# Patient Record
Sex: FEMALE | Race: BLACK OR AFRICAN AMERICAN | Employment: OTHER | URBAN - METROPOLITAN AREA
[De-identification: names, ages, dates, MRNs, and addresses within clinical notes are randomized per-mention and may not be internally consistent; named-entity substitution may affect disease eponyms.]

---

## 2019-02-18 ENCOUNTER — ANESTHESIA EVENT (OUTPATIENT)
Dept: ENDOSCOPY | Age: 79
End: 2019-02-18
Payer: MEDICARE

## 2019-02-19 ENCOUNTER — ANESTHESIA (OUTPATIENT)
Dept: ENDOSCOPY | Age: 79
End: 2019-02-19
Payer: MEDICARE

## 2019-02-19 ENCOUNTER — HOSPITAL ENCOUNTER (OUTPATIENT)
Age: 79
Setting detail: OUTPATIENT SURGERY
Discharge: HOME | End: 2019-02-19
Attending: INTERNAL MEDICINE | Admitting: INTERNAL MEDICINE
Payer: MEDICARE

## 2019-02-19 VITALS
WEIGHT: 138 LBS | RESPIRATION RATE: 13 BRPM | BODY MASS INDEX: 27.09 KG/M2 | SYSTOLIC BLOOD PRESSURE: 111 MMHG | DIASTOLIC BLOOD PRESSURE: 66 MMHG | OXYGEN SATURATION: 99 % | TEMPERATURE: 97.6 F | HEART RATE: 58 BPM | HEIGHT: 60 IN

## 2019-02-19 LAB — GLUCOSE BLD STRIP.AUTO-MCNC: 97 MG/DL (ref 70–110)

## 2019-02-19 PROCEDURE — 74011250636 HC RX REV CODE- 250/636: Performed by: NURSE ANESTHETIST, CERTIFIED REGISTERED

## 2019-02-19 PROCEDURE — 74011250636 HC RX REV CODE- 250/636

## 2019-02-19 PROCEDURE — 76060000032 HC ANESTHESIA 0.5 TO 1 HR: Performed by: INTERNAL MEDICINE

## 2019-02-19 PROCEDURE — 76040000007: Performed by: INTERNAL MEDICINE

## 2019-02-19 PROCEDURE — 82962 GLUCOSE BLOOD TEST: CPT

## 2019-02-19 RX ORDER — METOPROLOL SUCCINATE 25 MG/1
25 TABLET, EXTENDED RELEASE ORAL DAILY
COMMUNITY

## 2019-02-19 RX ORDER — SENNOSIDES 8.6 MG/1
1 TABLET ORAL 2 TIMES DAILY
COMMUNITY

## 2019-02-19 RX ORDER — SODIUM CHLORIDE 0.9 % (FLUSH) 0.9 %
5-40 SYRINGE (ML) INJECTION EVERY 8 HOURS
Status: CANCELLED | OUTPATIENT
Start: 2019-02-19

## 2019-02-19 RX ORDER — DEXTROMETHORPHAN/PSEUDOEPHED 2.5-7.5/.8
1.2 DROPS ORAL
Status: CANCELLED | OUTPATIENT
Start: 2019-02-19

## 2019-02-19 RX ORDER — SODIUM CHLORIDE, SODIUM LACTATE, POTASSIUM CHLORIDE, CALCIUM CHLORIDE 600; 310; 30; 20 MG/100ML; MG/100ML; MG/100ML; MG/100ML
75 INJECTION, SOLUTION INTRAVENOUS CONTINUOUS
Status: DISCONTINUED | OUTPATIENT
Start: 2019-02-19 | End: 2019-02-19 | Stop reason: HOSPADM

## 2019-02-19 RX ORDER — LIDOCAINE HYDROCHLORIDE 20 MG/ML
INJECTION, SOLUTION EPIDURAL; INFILTRATION; INTRACAUDAL; PERINEURAL
Status: COMPLETED
Start: 2019-02-19 | End: 2019-02-19

## 2019-02-19 RX ORDER — ATROPINE SULFATE 0.1 MG/ML
0.5 INJECTION INTRAVENOUS
Status: CANCELLED | OUTPATIENT
Start: 2019-02-19 | End: 2019-02-20

## 2019-02-19 RX ORDER — GABAPENTIN 100 MG/1
100 CAPSULE ORAL 3 TIMES DAILY
COMMUNITY

## 2019-02-19 RX ORDER — LIDOCAINE HYDROCHLORIDE 20 MG/ML
INJECTION, SOLUTION EPIDURAL; INFILTRATION; INTRACAUDAL; PERINEURAL AS NEEDED
Status: DISCONTINUED | OUTPATIENT
Start: 2019-02-19 | End: 2019-02-19 | Stop reason: HOSPADM

## 2019-02-19 RX ORDER — SODIUM CHLORIDE 0.9 % (FLUSH) 0.9 %
5-40 SYRINGE (ML) INJECTION AS NEEDED
Status: CANCELLED | OUTPATIENT
Start: 2019-02-19

## 2019-02-19 RX ORDER — PROPOFOL 10 MG/ML
INJECTION, EMULSION INTRAVENOUS
Status: COMPLETED
Start: 2019-02-19 | End: 2019-02-19

## 2019-02-19 RX ORDER — HYDROCHLOROTHIAZIDE 25 MG/1
25 TABLET ORAL DAILY
COMMUNITY

## 2019-02-19 RX ORDER — ATORVASTATIN CALCIUM 80 MG/1
80 TABLET, FILM COATED ORAL DAILY
COMMUNITY

## 2019-02-19 RX ORDER — SODIUM CHLORIDE 0.9 % (FLUSH) 0.9 %
5-40 SYRINGE (ML) INJECTION EVERY 8 HOURS
Status: DISCONTINUED | OUTPATIENT
Start: 2019-02-19 | End: 2019-02-19 | Stop reason: HOSPADM

## 2019-02-19 RX ORDER — FLUMAZENIL 0.1 MG/ML
0.2 INJECTION INTRAVENOUS
Status: CANCELLED | OUTPATIENT
Start: 2019-02-19 | End: 2019-02-19

## 2019-02-19 RX ORDER — PROPOFOL 10 MG/ML
INJECTION, EMULSION INTRAVENOUS AS NEEDED
Status: DISCONTINUED | OUTPATIENT
Start: 2019-02-19 | End: 2019-02-19 | Stop reason: HOSPADM

## 2019-02-19 RX ORDER — AMLODIPINE BESYLATE 10 MG/1
10 TABLET ORAL DAILY
COMMUNITY

## 2019-02-19 RX ORDER — ALBUTEROL SULFATE 90 UG/1
2 AEROSOL, METERED RESPIRATORY (INHALATION)
COMMUNITY

## 2019-02-19 RX ORDER — EPINEPHRINE 0.1 MG/ML
1 INJECTION INTRACARDIAC; INTRAVENOUS
Status: CANCELLED | OUTPATIENT
Start: 2019-02-19 | End: 2019-02-20

## 2019-02-19 RX ORDER — SODIUM CHLORIDE 0.9 % (FLUSH) 0.9 %
5-40 SYRINGE (ML) INJECTION AS NEEDED
Status: DISCONTINUED | OUTPATIENT
Start: 2019-02-19 | End: 2019-02-19 | Stop reason: HOSPADM

## 2019-02-19 RX ORDER — CHROMIUM PICOLINATE 200 MCG
1 TABLET ORAL DAILY
COMMUNITY

## 2019-02-19 RX ORDER — LOSARTAN POTASSIUM 100 MG/1
100 TABLET ORAL DAILY
COMMUNITY

## 2019-02-19 RX ORDER — MONTELUKAST SODIUM 10 MG/1
10 TABLET ORAL DAILY
COMMUNITY

## 2019-02-19 RX ORDER — NALOXONE HYDROCHLORIDE 0.4 MG/ML
0.4 INJECTION, SOLUTION INTRAMUSCULAR; INTRAVENOUS; SUBCUTANEOUS
Status: CANCELLED | OUTPATIENT
Start: 2019-02-19 | End: 2019-02-19

## 2019-02-19 RX ORDER — LANOLIN ALCOHOL/MO/W.PET/CERES
325 CREAM (GRAM) TOPICAL 2 TIMES DAILY
COMMUNITY

## 2019-02-19 RX ORDER — FENTANYL CITRATE 50 UG/ML
INJECTION, SOLUTION INTRAMUSCULAR; INTRAVENOUS
Status: COMPLETED
Start: 2019-02-19 | End: 2019-02-19

## 2019-02-19 RX ORDER — FENTANYL CITRATE 50 UG/ML
INJECTION, SOLUTION INTRAMUSCULAR; INTRAVENOUS AS NEEDED
Status: DISCONTINUED | OUTPATIENT
Start: 2019-02-19 | End: 2019-02-19 | Stop reason: HOSPADM

## 2019-02-19 RX ADMIN — PROPOFOL 20 MG: 10 INJECTION, EMULSION INTRAVENOUS at 11:01

## 2019-02-19 RX ADMIN — PROPOFOL 10 MG: 10 INJECTION, EMULSION INTRAVENOUS at 11:00

## 2019-02-19 RX ADMIN — PROPOFOL 20 MG: 10 INJECTION, EMULSION INTRAVENOUS at 11:02

## 2019-02-19 RX ADMIN — FENTANYL CITRATE 50 MCG: 50 INJECTION, SOLUTION INTRAMUSCULAR; INTRAVENOUS at 11:07

## 2019-02-19 RX ADMIN — PROPOFOL 10 MG: 10 INJECTION, EMULSION INTRAVENOUS at 10:58

## 2019-02-19 RX ADMIN — FAMOTIDINE 20 MG: 10 INJECTION INTRAVENOUS at 09:46

## 2019-02-19 RX ADMIN — FENTANYL CITRATE 50 MCG: 50 INJECTION, SOLUTION INTRAMUSCULAR; INTRAVENOUS at 11:00

## 2019-02-19 RX ADMIN — PROPOFOL 20 MG: 10 INJECTION, EMULSION INTRAVENOUS at 11:04

## 2019-02-19 RX ADMIN — PROPOFOL 20 MG: 10 INJECTION, EMULSION INTRAVENOUS at 11:07

## 2019-02-19 RX ADMIN — SODIUM CHLORIDE, SODIUM LACTATE, POTASSIUM CHLORIDE, AND CALCIUM CHLORIDE: 600; 310; 30; 20 INJECTION, SOLUTION INTRAVENOUS at 10:46

## 2019-02-19 RX ADMIN — LIDOCAINE HYDROCHLORIDE 60 MG: 20 INJECTION, SOLUTION EPIDURAL; INFILTRATION; INTRACAUDAL; PERINEURAL at 10:55

## 2019-02-19 RX ADMIN — SODIUM CHLORIDE, SODIUM LACTATE, POTASSIUM CHLORIDE, AND CALCIUM CHLORIDE 75 ML/HR: 600; 310; 30; 20 INJECTION, SOLUTION INTRAVENOUS at 09:46

## 2019-02-19 NOTE — PROCEDURES
[place] [address], [address] [employer] Procedure Note Saturnino Langford 1940 
[id number] Date of Procedure: 2/19/2019 Preoperative diagnosis: V85.23 - Z68.27,  Body mass index  27.0 - 27.9,  adult 
796.2 - R03.0,  Elevated blood pressure V76.51 - Z12.11,  Colon cancer Screening 
250.00 - E11.9,  Diabetes Postoperative diagnosis:  Diverticulosis. Internal hemorrhoids Type of Anesthesia: MAC (monitered anesthesia care) Description of Findings: same as post op dx Procedure: Procedure(s): 
COLONOSCOPY :  Dr. Neetu Mclaughlin MD 
 
Assistant(s): [unfilled] Type of Anesthesia:MAC  
 
EBL:None Specimens: * No specimens in log * Findings: See printed and scanned procedure note Complications: None Dr. Neetu Mclaughlin MD 
2/19/2019  11:19 AM

## 2019-02-19 NOTE — H&P
[email or web address] Impression: 1. CRCS Plan: 1. [place] with MAC Chief Complaint: CRCS 
 
 
HPI: 
Renato Arnett is a 77 y.o. female who is being seen preop for CRCS. Previous hx of incomplete study due to prep. Elizabeth Javier PMH:  
Past Medical History:  
Diagnosis Date  Asthma  Diabetes ([facility] [address]  Heart failure ([facility] [address]  Hypertension PSH:  
History reviewed. No pertinent surgical history. Social HX:  
Social History Socioeconomic History  Marital status: UNKNOWN Spouse name: Not on file  Number of children: Not on file  Years of education: Not on file  Highest education level: Not on file Social Needs  Financial resource strain: Not on file  Food insecurity - worry: Not on file  Food insecurity - inability: Not on file  Transportation needs - medical: Not on file  Transportation needs - non-medical: Not on file Occupational History  Not on file Tobacco Use  Smoking status: Never Smoker  Smokeless tobacco: Current User  Tobacco comment: Dips Snuff Substance and Sexual Activity  Alcohol use: No  
  Frequency: Never  Drug use: No  
 Sexual activity: No  
Other Topics Concern  Not on file Social History Narrative  Not on file FHX:  
History reviewed. No pertinent family history. Allergy: No Known Allergies Home Medications:  
 
Medications Prior to Admission Medication Sig  
 amLODIPine (NORVASC) 10 mg tablet Take 10 mg by mouth daily.  atorvastatin (LIPITOR) 80 mg tablet Take 80 mg by mouth daily.  Calcium-Cholecalciferol, D3, 600 mg(1,500mg) -400 unit cap Take 1 Cap by mouth daily.  ferrous sulfate 325 mg (65 mg iron) tablet Take 325 mg by mouth two (2) times a day.  gabapentin (NEURONTIN) 100 mg capsule Take 100 mg by mouth three (3) times daily.  hydroCHLOROthiazide (HYDRODIURIL) 25 mg tablet Take 25 mg by mouth daily.  losartan (COZAAR) 100 mg tablet Take 100 mg by mouth daily.  metoprolol succinate (TOPROL-XL) 25 mg XL tablet Take 25 mg by mouth daily.  montelukast (SINGULAIR) 10 mg tablet Take 10 mg by mouth daily.  senna (SENNA) 8.6 mg tablet Take 1 Tab by mouth two (2) times a day.  albuterol (VENTOLIN HFA) 90 mcg/actuation inhaler Take 2 Puffs by inhalation every six (6) hours as needed for Wheezing. Review of Systems:  
 
Constitutional: No fevers, chills, weight loss, fatigue. Cardiovascular: No chest pain, heart palpitations. Respiratory: No cough, SOB, wheezing, chest discomfort, orthopnea. Gastrointestinal: No GI or oabdominal complaints Musculoskeletal: No weakness, arthralgias, wasting. Allergies: As noted. Visit Vitals /41 Pulse 63 Temp 97.6 °F (36.4 °C) Resp 11 Ht 5' (1.524 m) Wt 62.6 kg (138 lb) SpO2 100% Breastfeeding? No  
BMI 26.95 kg/m² Physical Assessment:  
 
constitutional: appearance: well developed, well nourished, normal habitus, no deformities, in no acute distress. ENMT: mouth: normal oral mucosa,lips and gums; good dentition. oropharynx: normal tongue, hard and soft palate; posterior pharynx without erithema, exudate or lesions. respiratory: effort: normal chest excursion; no intercostal retraction or accessory muscle use. cardiovascular: abdominal aorta: normal size and position; no bruits. palpation: PMI of normal size and position; normal rhythm; no thrill or murmurs. abdominal: abdomen: normal consistency; no tenderness or masses. hernias: no hernias appreciated. liver: normal size and consistency. spleen: not palpable. rectal: hemoccult/guaiac: not performed. musculoskeletal: digits and nails: no clubbing, cyanosis, petechiae or other inflammatory conditions. psychiatric: orientation: oriented to time, space and person.   
  
 
Tianna Shelton MD, M.D.  
 [facility], [facility] Pager [phone] 
[email or web address]

## 2019-02-19 NOTE — ANESTHESIA PREPROCEDURE EVALUATION
Anesthetic History No history of anesthetic complications Review of Systems / Medical History Patient summary reviewed, nursing notes reviewed and pertinent labs reviewed Pulmonary Asthma : well controlled Neuro/Psych Within defined limits Cardiovascular Hypertension Past MI and CAD Exercise tolerance: >4 METS 
  
GI/Hepatic/Renal 
Within defined limits Endo/Other Diabetes Other Findings Physical Exam 
 
Airway Mallampati: III 
TM Distance: 4 - 6 cm Neck ROM: short neck Mouth opening: Normal 
 
 Cardiovascular Regular rate and rhythm,  S1 and S2 normal,  no murmur, click, rub, or gallop Rhythm: regular Rate: normal 
 
 
 
 Dental 
 
Dentition: Edentulous Pulmonary Breath sounds clear to auscultation Abdominal 
GI exam deferred Other Findings Anesthetic Plan ASA: 3 Anesthesia type: MAC Anesthetic plan and risks discussed with: Patient

## 2019-02-19 NOTE — DISCHARGE INSTRUCTIONS
Colonoscopy: What to Expect at [address] [place] [address]  After you have a colonoscopy, you will stay at the clinic for 1 to 2 hours until the medicines wear off. Then you can go home. But you will need to arrange for a ride. Your doctor will tell you when you can eat and do your other usual activities. Your doctor will talk to you about when you will need your next colonoscopy. Your doctor can help you decide how often you need to be checked. This will depend on the results of your test and your risk for colorectal cancer. After the test, you may be bloated or have gas pains. You may need to pass gas. If a biopsy was done or a polyp was removed, you may have streaks of blood in your stool (feces) for a few days. This care sheet gives you a general idea about how long it will take for you to recover. But each person recovers at a different pace. Follow the steps below to get better as quickly as possible. How can you care for yourself at home? Activity  · Rest when you feel tired. · You can do your normal activities when it feels okay to do so. Diet  · Follow your doctor's directions for eating. · Unless your doctor has told you not to, drink plenty of fluids. This helps to replace the fluids that were lost during the colon prep. · Do not drink alcohol. Medicines  · If polyps were removed or a biopsy was done during the test, your doctor may tell you not to take aspirin or other anti-inflammatory medicines for a few days. These include ibuprofen (Advil, Motrin) and naproxen (Aleve). Other instructions  · For your safety, do not drive or operate machinery until the medicine wears off and you can think clearly. Your doctor may tell you not to drive or operate machinery until the day after your test.  · Do not sign legal documents or make major decisions until the medicine wears off and you can think clearly. The anesthesia can make it hard for you to fully understand what you are agreeing to.   Follow-up care is a key part of your treatment and safety. Be sure to make and go to all appointments, and call your doctor if you are having problems. It's also a good idea to know your test results and keep a list of the medicines you take. When should you call for help? Call 911 anytime you think you may need emergency care. For example, call if:  · You passed out (lost consciousness). · You pass maroon or bloody stools. · You have severe belly pain. Call your doctor now or seek immediate medical care if:  · Your stools are black and tarlike. · Your stools have streaks of blood, but you did not have a biopsy or any polyps removed. · You have belly pain, or your belly is swollen and firm. · You vomit. · You have a fever. · You are very dizzy. Watch closely for changes in your health, and be sure to contact your doctor if you have any problems. Where can you learn more? Go to [employer]  Enter E264 in the search box to learn more about \"Colonoscopy: What to Expect at Home. \"   © 5282-6222 [employer]. Care instructions adapted under license by [facility] (which disclaims liability or warranty for this information). This care instruction is for use with your licensed healthcare professional. If you have questions about a medical condition or this instruction, always ask your healthcare professional. [place] [address] any warranty or liability for your use of this information. Content Version: 09.0.082401; Current as of: November 14, 2014      DISCHARGE SUMMARY from Nurse     POST-PROCEDURE INSTRUCTIONS:    Call your Physician if you:  ? Observe any excess bleeding. ? Develop a temperature over 100.5o F.  ? Experience abdominal, shoulder or chest pain. ? Notice any signs of decreased circulation or nerve impairment to an extremity such as a change in color, persistent numbness, tingling, coldness or increase in pain. ?  Vomit blood or you have nausea and vomiting lasting longer than 4 hours. ? Are unable to take medications. ? Are unable to urinate within 8 hours after discharge following general anesthesia or intravenous sedation. For the next 24 hours after receiving general anesthesia or intravenous sedation, or while taking prescription Narcotics, limit your activities:  ? Do NOT drive a motor vehicle, operate hazard machinery or power tools, or perform tasks that require coordination. The medication you received during your procedure may have some effect on your mental awareness. ? Do NOT make important personal or business decisions. The medication you received during your procedure may have some effect on your mental awareness. ? Do NOT drink alcoholic beverages. These drinks do not mix well with the medications that have been given to you. ? Upon discharge from the hospital, you must be accompanied by a responsible adult. ? Resume your diet as directed by your physician. ? Resume medications as your physician has prescribed. ? Please give a list of your current medications to your Primary Care Provider. ? Please update this list whenever your medications are discontinued, doses are changed, or new medications (including over-the-counter products) are added. ? Please carry medication information at all times in case of emergency situations. These are general instructions for a healthy lifestyle:    No smoking/ No tobacco products/ Avoid exposure to second hand smoke.  Surgeon General's Warning:  Quitting smoking now greatly reduces serious risk to your health. Obesity, smoking, and a sedentary lifestyle greatly increase your risk for illness.    A healthy diet, regular physical exercise & weight monitoring are important for maintaining a healthy lifestyle   You may be retaining fluid if you have a history of heart failure or if you experience any of the following symptoms:  Weight gain of 3 pounds or more overnight or 5 pounds in a week, increased swelling in our hands or feet or shortness of breath while lying flat in bed. Please call your doctor as soon as you notice any of these symptoms; do not wait until your next office visit. Recognize signs and symptoms of STROKE:  F  -  Face looks uneven  A  -  Arms unable to move or move unevenly  S  -  Speech slurred or non-existent  T  -  Time to call 911 - as soon as signs and symptoms begin - DO NOT go back to bed or wait to see If you get better - TIME IS BRAIN. Colorectal Screening   Colorectal cancer almost always develops from precancerous polyps (abnormal growths) in the colon or rectum. Screening tests can find precancerous polyps, so that they can be removed before they turn into cancer. Screening tests can also find colorectal cancer early, when treatment works best.  [facility] Speak with your physician about when you should begin screening and how often you should be tested. Additional Information    If you have questions, please call [phone]. Remember, MyChart is NOT to be used for urgent needs. For medical emergencies, dial 911. Educational references and/or instructions provided during this visit included:    Diverticulosis      Patient Education        Diverticulosis: Care Instructions  Your Care Instructions  In diverticulosis, pouches called diverticula form in the wall of the large intestine (colon). The pouches do not cause any pain or other symptoms. Most people who have diverticulosis do not know they have it. But the pouches sometimes bleed, and if they become infected, they can cause pain and other symptoms. When this happens, it is called diverticulitis. Diverticula form when pressure pushes the wall of the colon outward at certain weak points. A diet that is too low in fiber can cause diverticula. Follow-up care is a key part of your treatment and safety. Be sure to make and go to all appointments, and call your doctor if you are having problems.  It's also a good idea to know your test results and keep a list of the medicines you take. How can you care for yourself at home? · Include fruits, leafy green vegetables, beans, and whole grains in your diet each day. These foods are high in fiber. · Take a fiber supplement, such as Citrucel or Metamucil, every day if needed. Read and follow all instructions on the label. · Drink plenty of fluids, enough so that your urine is light yellow or clear like water. If you have kidney, heart, or liver disease and have to limit fluids, talk with your doctor before you increase the amount of fluids you drink. · Get at least 30 minutes of exercise on most days of the week. Walking is a good choice. You also may want to do other activities, such as running, swimming, cycling, or playing tennis or team sports. · Cut out foods that cause gas, pain, or other symptoms. When should you call for help? Call your doctor now or seek immediate medical care if:    · You have belly pain.     · You pass maroon or very bloody stools.     · You have a fever.     · You have nausea and vomiting.     · You have unusual changes in your bowel movements or abdominal swelling.     · You have burning pain when you urinate.     · You have abnormal vaginal discharge.     · You have shoulder pain.     · You have cramping pain that does not get better when you have a bowel movement or pass gas.     · You pass gas or stool from your urethra while urinating.    Watch closely for changes in your health, and be sure to contact your doctor if you have any problems. Where can you learn more? Go to http://maryse-jacey.info/. Enter I978 in the search box to learn more about \"Diverticulosis: Care Instructions. \"  Current as of: March 27, 2018  Content Version: 11.9  © 6785-5258 [employer]. Care instructions adapted under license by [employer] (which disclaims liability or warranty for this information).  If you have questions about a medical condition or this instruction, always ask your healthcare professional. [place] [address] any warranty or liability for your use of this information. Discharge information has been reviewed with the patient. The patient verbalized understanding.

## 2019-02-19 NOTE — ANESTHESIA POSTPROCEDURE EVALUATION
Procedure(s): 
COLONOSCOPY. Anesthesia Post Evaluation Multimodal analgesia: multimodal analgesia used between 6 hours prior to anesthesia start to PACU discharge Patient location during evaluation: bedside Patient participation: complete - patient participated Level of consciousness: awake Pain score: 0 Airway patency: patent Anesthetic complications: no 
Cardiovascular status: acceptable Respiratory status: acceptable Hydration status: acceptable Post anesthesia nausea and vomiting:  none Visit Vitals /41 Pulse 63 Temp 36.4 °C (97.6 °F) Resp 11 Ht 5' (1.524 m) Wt 62.6 kg (138 lb) SpO2 100% Breastfeeding? No  
BMI 26.95 kg/m²

## 2021-01-01 ENCOUNTER — HOSPITAL ENCOUNTER (EMERGENCY)
Age: 81
End: 2021-11-12
Attending: EMERGENCY MEDICINE
Payer: MEDICARE

## 2021-01-01 ENCOUNTER — APPOINTMENT (OUTPATIENT)
Dept: GENERAL RADIOLOGY | Age: 81
End: 2021-01-01
Attending: EMERGENCY MEDICINE
Payer: MEDICARE

## 2021-01-01 VITALS
WEIGHT: 138.01 LBS | HEIGHT: 60 IN | RESPIRATION RATE: 22 BRPM | SYSTOLIC BLOOD PRESSURE: 153 MMHG | TEMPERATURE: 97.7 F | DIASTOLIC BLOOD PRESSURE: 61 MMHG | BODY MASS INDEX: 27.09 KG/M2 | HEART RATE: 79 BPM | OXYGEN SATURATION: 100 %

## 2021-01-01 DIAGNOSIS — I50.43 ACUTE ON CHRONIC COMBINED SYSTOLIC AND DIASTOLIC CHF (CONGESTIVE HEART FAILURE) (HCC): Primary | ICD-10-CM

## 2021-01-01 DIAGNOSIS — I46.9 CARDIOPULMONARY ARREST (HCC): ICD-10-CM

## 2021-01-01 LAB
ALBUMIN SERPL-MCNC: 2.8 G/DL (ref 3.5–4.7)
ALBUMIN/GLOB SERPL: 0.9 {RATIO}
ALP SERPL-CCNC: 51 U/L (ref 38–126)
ALT SERPL-CCNC: 13 U/L (ref 3–52)
ANION GAP SERPL CALC-SCNC: 12 MMOL/L
ARTERIAL PATENCY WRIST A: ABNORMAL
AST SERPL W P-5'-P-CCNC: 25 U/L (ref 14–74)
ATRIAL RATE: 85 BPM
BASE EXCESS BLDA CALC-SCNC: 5.4 MMOL/L (ref 0–2.5)
BASOPHILS # BLD: 0 K/UL (ref 0–0.1)
BASOPHILS NFR BLD: 0 % (ref 0–2)
BDY SITE: ABNORMAL
BILIRUB SERPL-MCNC: 1.1 MG/DL (ref 0.2–1)
BNP SERPL-MCNC: 1630 PG/ML (ref 0–100)
BUN SERPL-MCNC: 28 MG/DL (ref 9–21)
BUN/CREAT SERPL: 20
CA-I BLD-MCNC: 7.3 MG/DL (ref 8.5–10.5)
CALCULATED R AXIS, ECG10: -6 DEGREES
CALCULATED T AXIS, ECG11: 171 DEGREES
CHLORIDE SERPL-SCNC: 100 MMOL/L (ref 94–111)
CK MB SERPL-MCNC: 1.6 NG/ML (ref 5–25)
CK SERPL-CCNC: 225 U/L (ref 26–140)
CO2 SERPL-SCNC: 31 MMOL/L (ref 21–33)
COHGB MFR BLD: 0.3 % (ref 0–3)
CREAT SERPL-MCNC: 1.4 MG/DL (ref 0.7–1.2)
DIAGNOSIS, 93000: NORMAL
DIFFERENTIAL METHOD BLD: ABNORMAL
EOSINOPHIL # BLD: 0.2 K/UL (ref 0–0.4)
EOSINOPHIL NFR BLD: 2 % (ref 0–5)
ERYTHROCYTE [DISTWIDTH] IN BLOOD BY AUTOMATED COUNT: 15.7 % (ref 11.6–14.5)
FIO2 ON VENT: 95 %
GAS FLOW.O2 O2 DELIVERY SYS: 15 L/MIN
GLOBULIN SER CALC-MCNC: 3.2 G/DL
GLUCOSE BLD STRIP.AUTO-MCNC: 168 MG/DL (ref 70–110)
GLUCOSE SERPL-MCNC: 182 MG/DL (ref 70–110)
HCO3 BLDA-SCNC: 30 MMOL/L (ref 23–27)
HCT VFR BLD AUTO: 28.2 % (ref 35–45)
HGB BLD OXIMETRY-MCNC: 8.6 G/DL (ref 12–16)
HGB BLD-MCNC: 9 G/DL (ref 12–16)
IMM GRANULOCYTES # BLD AUTO: 0.1 K/UL (ref 0–0.04)
IMM GRANULOCYTES NFR BLD AUTO: 1 % (ref 0–0.5)
LYMPHOCYTES # BLD: 1.3 K/UL (ref 0.9–3.6)
LYMPHOCYTES NFR BLD: 12 % (ref 21–52)
MCH RBC QN AUTO: 30.4 PG (ref 24–34)
MCHC RBC AUTO-ENTMCNC: 31.9 G/DL (ref 31–37)
MCV RBC AUTO: 95.3 FL (ref 78–100)
METHGB MFR BLD: 0.2 % (ref 0–3)
MONOCYTES # BLD: 0.5 K/UL (ref 0.05–1.2)
MONOCYTES NFR BLD: 4 % (ref 3–10)
NEUTS SEG # BLD: 9.2 K/UL (ref 1.8–8)
NEUTS SEG NFR BLD: 81 % (ref 40–73)
NRBC # BLD: 0 K/UL (ref 0–0.01)
NRBC BLD-RTO: 0 PER 100 WBC
OXYHGB MFR BLD: 97.7 % (ref 90–100)
P-R INTERVAL, ECG05: 234 MS
PCO2 BLDA: 42 MMHG (ref 35–45)
PERFORMED BY, TECHID: ABNORMAL
PH BLDA: 7.47 [PH] (ref 7.37–7.43)
PLATELET # BLD AUTO: 269 K/UL (ref 135–420)
PMV BLD AUTO: 11.9 FL (ref 9.2–11.8)
PO2 BLDA: 131 MMHG (ref 84–98)
POTASSIUM SERPL-SCNC: 2.1 MMOL/L (ref 3.2–5.1)
PROT SERPL-MCNC: 6 G/DL (ref 6.1–8.4)
Q-T INTERVAL, ECG07: 467 MS
QRS DURATION, ECG06: 150 MS
QTC CALCULATION (BEZET), ECG08: 549 MS
RBC # BLD AUTO: 2.96 M/UL (ref 4.2–5.3)
SAO2 % BLD: 98 % (ref 94–100)
SAO2% DEVICE SAO2% SENSOR NAME: ABNORMAL
SODIUM SERPL-SCNC: 143 MMOL/L (ref 135–145)
SPECIMEN SITE: ABNORMAL
TROPONIN I SERPL-MCNC: 0.08 NG/ML (ref 0.02–0.05)
VENTRICULAR RATE, ECG03: 83 BPM
WBC # BLD AUTO: 11.2 K/UL (ref 4.6–13.2)

## 2021-01-01 PROCEDURE — 75810000304 HC PLACE NEED INTRAOSSEOUS INFUS

## 2021-01-01 PROCEDURE — 74011000250 HC RX REV CODE- 250: Performed by: EMERGENCY MEDICINE

## 2021-01-01 PROCEDURE — 82803 BLOOD GASES ANY COMBINATION: CPT

## 2021-01-01 PROCEDURE — 36415 COLL VENOUS BLD VENIPUNCTURE: CPT

## 2021-01-01 PROCEDURE — 82550 ASSAY OF CK (CPK): CPT

## 2021-01-01 PROCEDURE — 83880 ASSAY OF NATRIURETIC PEPTIDE: CPT

## 2021-01-01 PROCEDURE — 74011250636 HC RX REV CODE- 250/636: Performed by: EMERGENCY MEDICINE

## 2021-01-01 PROCEDURE — 85025 COMPLETE CBC W/AUTO DIFF WBC: CPT

## 2021-01-01 PROCEDURE — 82962 GLUCOSE BLOOD TEST: CPT

## 2021-01-01 PROCEDURE — 75810000455 HC PLCMT CENT VENOUS CATH LVL 2 5182

## 2021-01-01 PROCEDURE — 99285 EMERGENCY DEPT VISIT HI MDM: CPT

## 2021-01-01 PROCEDURE — 36600 WITHDRAWAL OF ARTERIAL BLOOD: CPT

## 2021-01-01 PROCEDURE — 82553 CREATINE MB FRACTION: CPT

## 2021-01-01 PROCEDURE — 80053 COMPREHEN METABOLIC PANEL: CPT

## 2021-01-01 PROCEDURE — 84484 ASSAY OF TROPONIN QUANT: CPT

## 2021-01-01 PROCEDURE — 31500 INSERT EMERGENCY AIRWAY: CPT

## 2021-01-01 PROCEDURE — 93005 ELECTROCARDIOGRAM TRACING: CPT

## 2021-01-01 PROCEDURE — 71045 X-RAY EXAM CHEST 1 VIEW: CPT

## 2021-01-01 RX ORDER — SODIUM CHLORIDE AND POTASSIUM CHLORIDE .9; .15 G/100ML; G/100ML
SOLUTION INTRAVENOUS CONTINUOUS
Status: DISCONTINUED | OUTPATIENT
Start: 2021-01-01 | End: 2021-01-01 | Stop reason: HOSPADM

## 2021-01-01 RX ORDER — MULTIVITAMIN
1 TABLET ORAL 2 TIMES DAILY
COMMUNITY
Start: 2021-01-01

## 2021-01-01 RX ORDER — DEXTROSE MONOHYDRATE 50 MG/ML
INJECTION, SOLUTION INTRAVENOUS
Status: DISCONTINUED
Start: 2021-01-01 | End: 2021-01-01 | Stop reason: HOSPADM

## 2021-01-01 RX ORDER — FUROSEMIDE 10 MG/ML
60 INJECTION INTRAMUSCULAR; INTRAVENOUS
Status: DISCONTINUED | OUTPATIENT
Start: 2021-01-01 | End: 2021-01-01 | Stop reason: HOSPADM

## 2021-01-01 RX ORDER — DONEPEZIL HYDROCHLORIDE 5 MG/1
5 TABLET, FILM COATED ORAL DAILY
COMMUNITY
Start: 2021-01-01

## 2021-01-01 RX ORDER — POTASSIUM CHLORIDE 750 MG/1
40 TABLET, EXTENDED RELEASE ORAL
Status: DISCONTINUED | OUTPATIENT
Start: 2021-01-01 | End: 2021-01-01 | Stop reason: HOSPADM

## 2021-01-01 RX ORDER — POLYETHYLENE GLYCOL 3350 17 G/17G
POWDER, FOR SOLUTION ORAL
COMMUNITY
Start: 2021-01-01

## 2021-01-01 RX ORDER — EPINEPHRINE 0.1 MG/ML
INJECTION INTRACARDIAC; INTRAVENOUS
Status: COMPLETED | OUTPATIENT
Start: 2021-01-01 | End: 2021-01-01

## 2021-01-01 RX ORDER — MAGNESIUM SULFATE HEPTAHYDRATE 40 MG/ML
INJECTION, SOLUTION INTRAVENOUS
Status: DISCONTINUED
Start: 2021-01-01 | End: 2021-01-01 | Stop reason: WASHOUT

## 2021-01-01 RX ORDER — POTASSIUM CHLORIDE 7.45 MG/ML
INJECTION INTRAVENOUS
Status: DISCONTINUED
Start: 2021-01-01 | End: 2021-01-01 | Stop reason: HOSPADM

## 2021-01-01 RX ORDER — NOREPINEPHRINE BITARTRATE 1 MG/ML
INJECTION, SOLUTION INTRAVENOUS
Status: DISCONTINUED
Start: 2021-01-01 | End: 2021-01-01 | Stop reason: HOSPADM

## 2021-01-01 RX ORDER — SODIUM BICARBONATE 1 MEQ/ML
SYRINGE (ML) INTRAVENOUS
Status: COMPLETED | OUTPATIENT
Start: 2021-01-01 | End: 2021-01-01

## 2021-01-01 RX ORDER — POLYETHYLENE GLYCOL 3350 17 G/17G
17 POWDER, FOR SOLUTION ORAL DAILY
COMMUNITY
Start: 2021-01-01

## 2021-01-01 RX ORDER — FLUTICASONE PROPIONATE AND SALMETEROL 250; 50 UG/1; UG/1
POWDER RESPIRATORY (INHALATION)
COMMUNITY
Start: 2021-01-01

## 2021-01-01 RX ADMIN — EPINEPHRINE 1 MG: 0.1 INJECTION, SOLUTION ENDOTRACHEAL; INTRACARDIAC; INTRAVENOUS at 07:17

## 2021-01-01 RX ADMIN — EPINEPHRINE 1 MG: 0.1 INJECTION, SOLUTION ENDOTRACHEAL; INTRACARDIAC; INTRAVENOUS at 07:34

## 2021-01-01 RX ADMIN — SODIUM BICARBONATE 50 MEQ: 84 INJECTION INTRAVENOUS at 07:30

## 2021-01-01 RX ADMIN — EPINEPHRINE 1 MG: 0.1 INJECTION, SOLUTION ENDOTRACHEAL; INTRACARDIAC; INTRAVENOUS at 07:29

## 2021-01-01 RX ADMIN — EPINEPHRINE 1 MG: 0.1 INJECTION, SOLUTION ENDOTRACHEAL; INTRACARDIAC; INTRAVENOUS at 07:38

## 2021-11-12 NOTE — ED NOTES
Pt with agonal respirations, MD to bedside, apnea noted, pulse thready then pulseless. Code blue called, pt moved to trauma 1. See code charting.

## 2021-11-12 NOTE — ED NOTES
Attempt to transition pt from NRB to NC, saturation dropped from 100% to 84%. Returned to [place]. Pt continues to state \"feeling much better now\". Daughter to bedside, unable to provide accurate Rx hx.

## 2021-11-12 NOTE — ED PROVIDER NOTES
EMERGENCY DEPARTMENT HISTORY AND PHYSICAL EXAM      Date: 11/12/2021  Patient Name: Carly Izquierdo      History of Presenting Illness     Chief Complaint   Patient presents with    Shortness of Breath       History Provided By: Patient and EMS    HPI: Carly Izquierdo, 79 y.o. female with a past medical history significant diabetes, hypertension, asthma and CHF presents to the ED, via EMS, with cc of shortness of breath. EMS report were called at scene by patient's daughter for patient being short of breath. Shortness of breath was of sudden onset this morning. EMS report pulse ox 78% on room air at scene. Patient placed on nonrebreather mask with improvement of pulse ox to the 90s. Patient is a history of CHF. She is a poor historian, she states she became short of breath this morning but is feeling better with oxygen. She denies chest pain, cough, abdomen pain. There are no other complaints, changes, or physical findings at this time. PCP: Harley Salinas MD    Current Facility-Administered Medications   Medication Dose Route Frequency Provider Last Rate Last Admin    furosemide (LASIX) injection 60 mg  60 mg IntraVENous NOW Marv Britt MD        potassium chloride (KLOR-CON) tablet 40 mEq  40 mEq Oral NOW Lillie Britt MD        0.9% sodium chloride with KCl 20 mEq/L infusion   IntraVENous CONTINUOUS Marv Britt MD        potassium chloride in water 10 mEq/100 mL IVPB premix pgbk             NOREPINephrine (LEVOPHED) 1 mg/mL injection             dextrose 5% infusion              Current Outpatient Medications   Medication Sig Dispense Refill    polyethylene glycol (MIRALAX) 17 gram/dose powder Take 17 g by mouth daily.  fluticasone propion-salmeteroL (Advair Diskus) 250-50 mcg/dose diskus inhaler INHALE 1 PUFF PO  BID      amLODIPine (NORVASC) 10 mg tablet Take 10 mg by mouth daily.       atorvastatin (LIPITOR) 80 mg tablet Take 80 mg by mouth daily.  ferrous sulfate 325 mg (65 mg iron) tablet Take 325 mg by mouth two (2) times a day.  hydroCHLOROthiazide (HYDRODIURIL) 25 mg tablet Take 25 mg by mouth daily.  losartan (COZAAR) 100 mg tablet Take 100 mg by mouth daily.  metoprolol succinate (TOPROL-XL) 25 mg XL tablet Take 25 mg by mouth daily.  calcium-cholecalciferol, D3, (CALTRATE 600+D) tablet Take 1 Tablet by mouth two (2) times a day.  polyethylene glycol (MIRALAX) 17 gram packet MIX AND DRINK 1 PACKET BY MOUTH EVERY DAY. MAY TAKE 2-4 DAYS FOR RESULTS. CAN TAKE DAILY UP TO 2 WEEKS      donepeziL (ARICEPT) 5 mg tablet Take 5 mg by mouth daily.  donepeziL (ARICEPT) 5 mg tablet Take 5 mg by mouth daily.  Calcium-Cholecalciferol, D3, 600 mg(1,500mg) -400 unit cap Take 1 Cap by mouth daily.  gabapentin (NEURONTIN) 100 mg capsule Take 100 mg by mouth three (3) times daily. (Patient not taking: Reported on 11/12/2021)      montelukast (SINGULAIR) 10 mg tablet Take 10 mg by mouth daily. (Patient not taking: Reported on 11/12/2021)      senna (SENNA) 8.6 mg tablet Take 1 Tab by mouth two (2) times a day.  albuterol (VENTOLIN HFA) 90 mcg/actuation inhaler Take 2 Puffs by inhalation every six (6) hours as needed for Wheezing. Past History     Past Medical History:  Past Medical History:   Diagnosis Date    Asthma     Diabetes ([address]     Heart failure ([address]     Hypertension        Past Surgical History:  Past Surgical History:   Procedure Laterality Date    COLONOSCOPY N/A 2/19/2019    COLONOSCOPY performed by Mane Coello MD at [facility] ENDOSCOPY       Family History:  History reviewed. No pertinent family history.     Social History:  Social History     Tobacco Use    Smoking status: Never Smoker    Smokeless tobacco: Current User    Tobacco comment: Dips Snuff   Substance Use Topics    Alcohol use: No    Drug use: No       Allergies:  No Known Allergies      Review of Systems Review of Systems   Constitutional: Negative for chills and fever. HENT: Negative for congestion and sore throat. Respiratory: Positive for shortness of breath. Negative for cough. Cardiovascular: Positive for leg swelling. Negative for chest pain. Gastrointestinal: Negative for abdominal distention, nausea and vomiting. Genitourinary: Negative for difficulty urinating, dysuria, flank pain, frequency, hematuria, urgency, vaginal bleeding and vaginal discharge. Musculoskeletal: Negative for arthralgias and joint swelling. Skin: Negative for rash and wound. Neurological: Negative for dizziness, weakness, light-headedness and headaches. Hematological: Negative for adenopathy. Physical Exam     Physical Exam  Vitals and nursing note reviewed. Constitutional:       General: She is not in acute distress. Appearance: She is well-developed. She is not diaphoretic. Comments: Elderly female in no acute distress. HENT:      Head: Normocephalic and atraumatic. Jaw: No trismus. Right Ear: External ear normal. No swelling or tenderness. Tympanic membrane is not perforated, erythematous or bulging. Left Ear: External ear normal. No swelling or tenderness. Tympanic membrane is not perforated, erythematous or bulging. Nose: Nose normal. No mucosal edema or rhinorrhea. Right Sinus: No maxillary sinus tenderness or frontal sinus tenderness. Left Sinus: No maxillary sinus tenderness or frontal sinus tenderness. Mouth/Throat:      Mouth: No oral lesions. Dentition: No dental abscesses. Pharynx: Uvula midline. No oropharyngeal exudate, posterior oropharyngeal erythema or uvula swelling. Tonsils: No tonsillar abscesses. Eyes:      General: No scleral icterus. Right eye: No discharge. Left eye: No discharge. Conjunctiva/sclera: Conjunctivae normal.   Cardiovascular:      Rate and Rhythm: Normal rate and regular rhythm. Heart sounds: Normal heart sounds. No murmur heard. No friction rub. No gallop. Pulmonary:      Effort: Pulmonary effort is normal. No tachypnea, accessory muscle usage or respiratory distress. Breath sounds: Examination of the right-lower field reveals rales. Examination of the left-lower field reveals rales. Rales present. No decreased breath sounds, wheezing or rhonchi. Abdominal:      Palpations: Abdomen is soft. Musculoskeletal:         General: No tenderness. Normal range of motion. Cervical back: Normal range of motion and neck supple. Right lower leg: Edema present. Left lower leg: Edema present. Lymphadenopathy:      Cervical: No cervical adenopathy. Skin:     General: Skin is warm and dry. Findings: No erythema or rash. Neurological:      Mental Status: She is alert and oriented to person, place, and time.    Psychiatric:         Judgment: Judgment normal.         Lab and Diagnostic Study Results     Labs -     Recent Results (from the past 12 hour(s))   EKG, 12 LEAD, INITIAL    Collection Time: 11/12/21  5:30 AM   Result Value Ref Range    Ventricular Rate 83 BPM    Atrial Rate 85 BPM    P-R Interval 234 ms    QRS Duration 150 ms    Q-T Interval 467 ms    QTC Calculation (Bezet) 549 ms    Calculated R Axis -6 degrees    Calculated T Axis 171 degrees    Diagnosis Atrial fibrillation  Left bundle branch block      CBC WITH AUTOMATED DIFF    Collection Time: 11/12/21  5:45 AM   Result Value Ref Range    WBC 11.2 4.6 - 13.2 K/uL    RBC 2.96 (L) 4.20 - 5.30 M/uL    HGB 9.0 (L) 12.0 - 16.0 g/dL    HCT 28.2 (L) 35.0 - 45.0 %    MCV 95.3 78.0 - 100.0 FL    MCH 30.4 24.0 - 34.0 PG    MCHC 31.9 31.0 - 37.0 g/dL    RDW 15.7 (H) 11.6 - 14.5 %    PLATELET 965 972 - 727 K/uL    MPV 11.9 (H) 9.2 - 11.8 FL    NRBC 0.0 0.0  WBC    ABSOLUTE NRBC 0.00 0.00 - 0.01 K/uL    NEUTROPHILS 81 (H) 40 - 73 %    LYMPHOCYTES 12 (L) 21 - 52 %    MONOCYTES 4 3 - 10 %    EOSINOPHILS 2 0 - 5 % BASOPHILS 0 0 - 2 %    IMMATURE GRANULOCYTES 1 (H) 0 - 0.5 %    ABS. NEUTROPHILS 9.2 (H) 1.8 - 8.0 K/UL    ABS. LYMPHOCYTES 1.3 0.9 - 3.6 K/UL    ABS. MONOCYTES 0.5 0.05 - 1.2 K/UL    ABS. EOSINOPHILS 0.2 0.0 - 0.4 K/UL    ABS. BASOPHILS 0.0 0.0 - 0.1 K/UL    ABS. IMM. GRANS. 0.1 (H) 0.00 - 0.04 K/UL    DF AUTOMATED     METABOLIC PANEL, COMPREHENSIVE    Collection Time: 11/12/21  5:45 AM   Result Value Ref Range    Sodium 143 135 - 145 mmol/L    Potassium 2.1 (LL) 3.2 - 5.1 mmol/L    Chloride 100 94 - 111 mmol/L    CO2 31 21 - 33 mmol/L    Anion gap 12 mmol/L    Glucose 182 (H) 70 - 110 mg/dL    BUN 28 (H) 9 - 21 mg/dL    Creatinine 1.40 (H) 0.70 - 1.20 mg/dL    BUN/Creatinine ratio 20      GFR est AA 44 ml/min/1.73m2    GFR est non-AA 36 ml/min/1.73m2    Calcium 7.3 (L) 8.5 - 10.5 mg/dL    Bilirubin, total 1.1 (H) 0.2 - 1.0 mg/dL    AST (SGOT) 25 14 - 74 U/L    ALT (SGPT) 13 3 - 52 U/L    Alk. phosphatase 51 38 - 126 U/L    Protein, total 6.0 (L) 6.1 - 8.4 g/dL    Albumin 2.8 (L) 3.5 - 4.7 g/dL    Globulin 3.2 g/dL    A-G Ratio 0.9     TROPONIN I    Collection Time: 11/12/21  5:45 AM   Result Value Ref Range    Troponin-I, Qt. 0.08 (H) 0.02 - 0.05 ng/mL   BLOOD GAS, ARTERIAL    Collection Time: 11/12/21  5:45 AM   Result Value Ref Range    pH 7.47 (H) 7.37 - 7.43      PCO2 42 35.0 - 45.0 mmHg    PO2 131 (H) 84 - 98 mmHg    O2 SAT 98 94 - 100 %    BICARBONATE 30 (H) 23.0 - 27.0 mmol/L    BASE EXCESS 5.4 (H) 0.0 - 2.5 mmol/L    O2 METHOD Non-rebreathing mask      O2 FLOW RATE 15 L/min    FIO2 95.0 %    Sample source Arterial      SITE Right Radial      RAJIV'S TEST PASS      Carboxy-Hgb 0.3 0 - 3 %    Methemoglobin 0.2 0 - 3 %    tHb 8.6 (L) 12.0 - 16.0 g/dL    Oxyhemoglobin 97.7 90 - 100 %   BNP    Collection Time: 11/12/21  5:45 AM   Result Value Ref Range    BNP 1,630 (H) 0 - 100 pg/mL   CK    Collection Time: 11/12/21  5:45 AM   Result Value Ref Range     (H) 26 - 140 U/L   CK-MB,QUANT.     Collection Time: 11/12/21  5:45 AM   Result Value Ref Range    CK - MB 1.6 ng/mL   GLUCOSE, POC    Collection Time: 11/12/21  7:02 AM   Result Value Ref Range    Glucose (POC) 168 (H) 70 - 110 mg/dL    Performed by Hernandez Marcano        Radiologic Studies -   [unfilled]  CT Results  (Last 48 hours)    None        CXR Results  (Last 48 hours)               11/12/21 0640  XR CHEST PORT Final result    Impression:  No prior studies. Multifocal bilateral pulmonary opacities may   represent asymmetric pulmonary edema or multifocal infectious process. Narrative:  EXAM:  PORTABLE CHEST       INDICATION:  Shortness of breath. TECHNIQUE:  Portable, AP view. COMPARISON:  None.       ____________________       FINDINGS:         SUPPORT DEVICES: None. HEART AND MEDIASTINUM: Visualized cardiac silhouette appears mildly to   moderately enlarged. LUNGS AND PLEURAL SPACES: Lungs are adequately expanded. Multifocal opacities   seen within the right upper lobe, left midlung zone in both bases. Suspect left   pleural effusion. BONY THORAX AND SOFT TISSUES: No acute osseous abnormality. Degenerative   changes around the visualized shoulders. ____________________                 Medical Decision Making and ED Course   - I am the first and primary provider for this patient AND AM THE PRIMARY PROVIDER OF RECORD. - I reviewed the vital signs, available nursing notes, past medical history, past surgical history, family history and social history. - Initial assessment performed. The patients presenting problems have been discussed, and the staff are in agreement with the care plan formulated and outlined with them. I have encouraged them to ask questions as they arise throughout their visit. Vital Signs-Reviewed the patient's vital signs.     Patient Vitals for the past 12 hrs:   Temp Pulse Resp BP SpO2   11/12/21 0636  79 22 (!) 153/61 100 %   11/12/21 0546 97.7 °F (36.5 °C) 77 22 133/81 100 %       EKG interpretation: (Preliminary): Performed at 05:30, and read at 05:30  Rhythm: normal sinus rhythm; and regular . Rate (approx.): 83; Axis: normal; MN interval: normal; QRS interval: normal ; ST/T wave: Nonspecific ST-T other findings: Left bundle branch block. Records Reviewed: Nursing Notes and Old Medical Records    The patient presents with shortness of breath with a differential diagnosis of CHF, COPD exacerbation, asthma exacerbation, pleural effusions, pneumonia, pneumothorax, malignancy    ED Course:     Patient was still in ED but being admitted for CHF. Called to patient's bedside for difficulty breathing. Her daughter was at bedside. I had just talked with patient and daughter about patient's wishes, patient did not say much but daughter advised patient would prefer intubation and CPR. Soon after patient went into respiratory arrest.  CPR was initiated and ACLS advanced. Patient intubated. ACLS advanced with assistance of Dr. Aye Lynn. All efforts however failed and patient pronounced dead at  07:42         Provider Notes (Medical Decision Making):     MDM  Number of Diagnoses or Management Options  Risk of Complications, Morbidity, and/or Mortality  General comments: 8 am I assisted CPR in this patient who was in respiratory arrest, PEA secondary to CHF with Pulmonary Edema. I placed an IO in right humeral head and a right femoral vein triple lumen central line. CPR was continued as per ACLS protocol and patient pronounced dead at 7:42 am. Dr Renita Kapoor and I informed patient's daughter Ford Cuadra?) of her demise. Consultations:       Consultations:         Procedures and Critical Care       Performed by: Adam Hobbs  PROCEDURES:  Intubation    Date/Time: 11/20/2021 9:25 AM  Performed by: Madelaine Hollins MD  Authorized by: Madelaine Hollins MD     Consent:     Consent obtained:  Emergent situation    Consent given by: Daughter.   Pre-procedure details:     Patient status:  Unresponsive    Mallampati score:  II    Pretreatment medications:  None  Procedure details:     Preoxygenation:  Bag valve mask    CPR in progress: yes      Intubation method:  Oral    Laryngoscope blade:  River 4    Tube size (mm):  7.5    Tube type:  Cuffed    Number of attempts:  2    Ventilation between attempts: yes      Cricoid pressure: yes      Tube visualized through cords: yes    Placement assessment:     ETT to lip:  23    Tube secured with:  ETT pedroza    Breath sounds:  Equal    Placement verification: chest rise, condensation, equal breath sounds and ETCO2 detector    Post-procedure details:     Patient tolerance of procedure: Tolerated well, no immediate complications                   CRITICAL CARE NOTE :  5:45 AM  Amount of Critical Care Time: 80(minutes)    IMPENDING DETERIORATION -Airway, Respiratory and Cardiovascular  ASSOCIATED RISK FACTORS - Hypoxia and Metabolic changes  MANAGEMENT- Bedside Assessment  INTERPRETATION -  Xrays, Blood Gases, ECG and Blood Pressure  INTERVENTIONS -intubation and resuscitation attempts. CASE REVIEW - Family  TREATMENT RESPONSE -  PERFORMED BY - Physician    NOTES   :  I have spent critical care time involved in lab review, consultations with specialist, family decision- making, bedside attention and documentation. This time excludes time spent in any separate billed procedures. During this entire length of time I was immediately available to the patient . Herbert Balwinder      Disposition     Disposition:     Diagnosis:   1. Acute on chronic combined systolic and diastolic CHF (congestive heart failure) ([facility] [address]    2.     3. Cardiopulmonary arrest [facility])          Disposition:             Diagnosis     Clinical Impression:   1. Acute on chronic combined systolic and diastolic CHF (congestive heart failure) ([facility] [address]    2.     3.  Cardiopulmonary arrest ([facility] [address]        Attestations:    Chris Garner, MD    Please note that this dictation was completed with [employer], the computer voice recognition software. Quite often unanticipated grammatical, syntax, homophones, and other interpretive errors are inadvertently transcribed by the computer software. Please disregard these errors. Please excuse any errors that have escaped final proofreading. Thank you.

## 2021-11-12 NOTE — ED TRIAGE NOTES
Pt arrive via EMS, per EMS pt was shallowly breathing on scene, repositioned and effort improved, saturation low onscene per EMS, placed on NRB at 15. Saturation 100% on 15 LPM on arrival. Family enroute, known hx of dementia.

## 2021-11-12 NOTE — PROGRESS NOTES
responded to Death of  Fatemeh Ruvalcaba, who was a 79 y.o.,female,     The  provided the following Interventions:  Provided crisis pastoral care, pastoral support and grief interventions. Offered prayers on behalf of the patient. Chart reviewed. Plan:  Chaplains will continue to follow and will provide pastoral care on an as needed/requested basis and grief support for the family.       [id number] [facility].   [phone]

## 2021-11-12 NOTE — ED NOTES
RN entered room to pt's family chidilling, pt abisai, , pt with increased effort of breathing. EDMD aware, respiratory called to place pt on BIPAP.

## (undated) DEVICE — FLEX ADVANTAGE 1500CC: Brand: FLEX ADVANTAGE

## (undated) DEVICE — AIRLIFE™ NASAL OXYGEN CANNULA CURVED, NONFLARED TIP WITH 14 FOOT (4.3 M) CRUSH-RESISTANT TUBING, OVER-THE-EAR STYLE: Brand: AIRLIFE™

## (undated) DEVICE — CATH IV SAFE STR 22GX1IN BLU -- PROTECTIV PLUS

## (undated) DEVICE — Device

## (undated) DEVICE — MEDI-VAC NON-CONDUCTIVE SUCTION TUBING: Brand: CARDINAL HEALTH